# Patient Record
Sex: MALE | Race: WHITE | NOT HISPANIC OR LATINO | URBAN - METROPOLITAN AREA
[De-identification: names, ages, dates, MRNs, and addresses within clinical notes are randomized per-mention and may not be internally consistent; named-entity substitution may affect disease eponyms.]

---

## 2023-12-12 ENCOUNTER — HOSPITAL ENCOUNTER (OUTPATIENT)
Facility: CLINIC | Age: 18
Discharge: HOME | End: 2023-12-12
Attending: INTERNAL MEDICINE
Payer: COMMERCIAL

## 2023-12-12 VITALS
HEART RATE: 124 BPM | BODY MASS INDEX: 25.18 KG/M2 | OXYGEN SATURATION: 97 % | SYSTOLIC BLOOD PRESSURE: 120 MMHG | WEIGHT: 170 LBS | TEMPERATURE: 99.9 F | HEIGHT: 69 IN | DIASTOLIC BLOOD PRESSURE: 66 MMHG | RESPIRATION RATE: 16 BRPM

## 2023-12-12 DIAGNOSIS — J10.1 INFLUENZA A: Primary | ICD-10-CM

## 2023-12-12 LAB
EXPIRATION DATE: ABNORMAL
EXPIRATION DATE: NORMAL
Lab: ABNORMAL
Lab: NORMAL
POCT MANUFACTURER: ABNORMAL
POCT MANUFACTURER: NORMAL
RAPID INFLUENZA A AGN: POSITIVE
RAPID INFLUENZA B AGN: NEGATIVE
S PYO AG THROAT QL: NEGATIVE

## 2023-12-12 PROCEDURE — S9088 SERVICES PROVIDED IN URGENT: HCPCS | Performed by: NURSE PRACTITIONER

## 2023-12-12 PROCEDURE — 87880 STREP A ASSAY W/OPTIC: CPT | Performed by: NURSE PRACTITIONER

## 2023-12-12 PROCEDURE — 87804 INFLUENZA ASSAY W/OPTIC: CPT | Performed by: NURSE PRACTITIONER

## 2023-12-12 PROCEDURE — 99202 OFFICE O/P NEW SF 15 MIN: CPT | Performed by: NURSE PRACTITIONER

## 2023-12-12 RX ORDER — BENZONATATE 100 MG/1
100 CAPSULE ORAL 3 TIMES DAILY PRN
Qty: 30 CAPSULE | Refills: 0 | Status: SHIPPED | OUTPATIENT
Start: 2023-12-12 | End: 2023-12-22

## 2023-12-12 RX ORDER — OSELTAMIVIR PHOSPHATE 75 MG/1
75 CAPSULE ORAL 2 TIMES DAILY
Qty: 10 CAPSULE | Refills: 0 | Status: SHIPPED | OUTPATIENT
Start: 2023-12-12 | End: 2023-12-17

## 2023-12-12 ASSESSMENT — ENCOUNTER SYMPTOMS
SORE THROAT: 1
SHORTNESS OF BREATH: 0
HEADACHES: 1
CHILLS: 1
FEVER: 1
COUGH: 1
MYALGIAS: 1

## 2023-12-12 NOTE — ED ATTESTATION NOTE
I was immediately available to provide supervision and direction for the care of the patient.     Russ Gill,   12/12/23 1719

## 2023-12-12 NOTE — ED PROVIDER NOTES
Emergency Medicine Note  HPI   HISTORY OF PRESENT ILLNESS     18-year-old male presents today with complaint of a cough, body aches, chills, sore throat, fever and a headache.  Symptoms started approximately 2 days ago.  Has been taking Advil and DayQuil for his symptoms thus far.            Patient History   PAST HISTORY     Reviewed from Nursing Triage:       No past medical history on file.    No past surgical history on file.    No family history on file.           Review of Systems   REVIEW OF SYSTEMS     Review of Systems   Constitutional: Positive for chills and fever.   HENT: Positive for congestion and sore throat.    Respiratory: Positive for cough. Negative for shortness of breath.    Cardiovascular: Negative for chest pain.   Musculoskeletal: Positive for myalgias.   Neurological: Positive for headaches.         VITALS     ED Vitals    Date/Time Temp Pulse Resp BP SpO2 [facility]   12/12/23 1550 37.7 °C (99.9 °F) 124 16 120/66 97 % [facility]                       Physical Exam   PHYSICAL EXAM     Physical Exam  Vitals reviewed.   Constitutional:       Appearance: He is ill-appearing.   Cardiovascular:      Rate and Rhythm: Tachycardia present.      Heart sounds: Normal heart sounds, S1 normal and S2 normal.   Pulmonary:      Effort: Pulmonary effort is normal.      Breath sounds: Normal breath sounds and air entry.   Musculoskeletal:      Cervical back: Normal range of motion and neck supple.   Lymphadenopathy:      Cervical: No cervical adenopathy.   Neurological:      Mental Status: He is alert and oriented to person, place, and time.   Psychiatric:         Mood and Affect: Mood normal.         Behavior: Behavior normal.         Thought Content: Thought content normal.         Judgment: Judgment normal.           PROCEDURES     Procedures     DATA     Results     None              No orders to display       Scoring tools                                  ED Course & MDM   MDM / ED COURSE / CLINICAL IMPRESSION /  DISPO     Medical Decision Making  Rapid strep negative.  Positive for influenza A  Rx Tamiflu p.o. twice daily x 5 days, Tessalon Perles as needed for cough  Recommended 5-day isolation.  Supportive care           Clinical Impression      Influenza A               Ligia Garza CRNP  12/12/23 1956

## 2023-12-12 NOTE — Clinical Note
Willy Boggs was seen and treated in our urgent care on 12/12/2023.  He may return to school on 12/16/2023.  Please excuse Willy's absence due to an illness for the following dates: 12/10-12/15/23.    If you have any questions or concerns, please don't hesitate to call.      Ligia Garza CRNP

## 2023-12-12 NOTE — DISCHARGE INSTRUCTIONS
You tested positive for influenza A.  I have sent over a prescription for Tamiflu, take 1 capsule by mouth twice a day for 5 days.  Tessalon Perles, 1 capsule by mouth up to 3 times a day as needed for your cough.  Ibuprofen, 600 mg every 6 hours with food for pain or fevers or extra strength Tylenol every 8 hours.  Stable hydrated.  Isolate for total 5 days from symptom onset.

## 2024-06-05 ENCOUNTER — HOSPITAL ENCOUNTER (OUTPATIENT)
Facility: CLINIC | Age: 19
Discharge: HOME | End: 2024-06-05
Attending: FAMILY MEDICINE

## 2024-06-05 VITALS
OXYGEN SATURATION: 97 % | BODY MASS INDEX: 24.29 KG/M2 | HEIGHT: 69 IN | DIASTOLIC BLOOD PRESSURE: 69 MMHG | SYSTOLIC BLOOD PRESSURE: 101 MMHG | TEMPERATURE: 98.4 F | WEIGHT: 164 LBS | HEART RATE: 60 BPM

## 2024-06-05 DIAGNOSIS — Z02.0 SCHOOL PHYSICAL EXAM: Primary | ICD-10-CM

## 2024-06-05 PROCEDURE — 99499SP PR $65 PHYSICAL: Performed by: FAMILY MEDICINE

## 2024-06-05 ASSESSMENT — ENCOUNTER SYMPTOMS
PALPITATIONS: 0
VOICE CHANGE: 1
VOMITING: 0
COUGH: 1
NUMBNESS: 0
SINUS PAIN: 0
CHEST TIGHTNESS: 0
DIZZINESS: 0
SHORTNESS OF BREATH: 0
SINUS PRESSURE: 1
SORE THROAT: 0
FATIGUE: 0
CHILLS: 0
NAUSEA: 0
ABDOMINAL PAIN: 0
MYALGIAS: 0
FEVER: 0

## 2024-06-05 NOTE — ED PROVIDER NOTES
History  Chief Complaint   Patient presents with    Physical      Presenting for travel physical. He'll be traveling to [facility]  PMHx +Seasonal allergies, taking zyrtec and flonase. D/c'ed Fluoxetine, feeling well without it. Denies SI/HI.   No concerns/complaints at this time.         History provided by:  Patient   used: No        No past medical history on file.    No past surgical history on file.    No family history on file.         Review of Systems   Constitutional:  Negative for chills, fatigue and fever.   HENT:  Positive for congestion, postnasal drip, sinus pressure and voice change. Negative for sinus pain, sneezing and sore throat.    Respiratory:  Positive for cough. Negative for chest tightness and shortness of breath.    Cardiovascular:  Negative for chest pain and palpitations.   Gastrointestinal:  Negative for abdominal pain, nausea and vomiting.   Musculoskeletal:  Negative for myalgias.   Neurological:  Negative for dizziness and numbness.       Physical Exam  ED Triage Vitals [06/05/24 1921]   Temp Heart Rate Resp BP SpO2   36.9 °C (98.4 °F) 60 -- 101/69 97 %      Temp src Heart Rate Source Patient Position BP Location FiO2 (%) (Set)   -- Monitor Sitting Left upper arm --       Physical Exam  Vitals and nursing note reviewed.   Constitutional:       Appearance: He is well-developed.   HENT:      Head: Normocephalic and atraumatic.   Eyes:      Conjunctiva/sclera: Conjunctivae normal.   Cardiovascular:      Rate and Rhythm: Normal rate and regular rhythm.      Heart sounds: No murmur heard.  Pulmonary:      Effort: Pulmonary effort is normal. No respiratory distress.      Breath sounds: Normal breath sounds.   Abdominal:      Palpations: Abdomen is soft.      Tenderness: There is no abdominal tenderness.   Musculoskeletal:      Cervical back: Neck supple.   Skin:     General: Skin is warm and dry.   Neurological:      Mental Status: He is alert.            Procedures  Procedures    UC Course       Medical Decision Making  Travel Physical  Exam WNL  Cleared for full participation.                            Pool Cunha DO  06/05/24 1952